# Patient Record
Sex: MALE | URBAN - METROPOLITAN AREA
[De-identification: names, ages, dates, MRNs, and addresses within clinical notes are randomized per-mention and may not be internally consistent; named-entity substitution may affect disease eponyms.]

---

## 2017-06-30 ENCOUNTER — APPOINTMENT (RX ONLY)
Dept: URBAN - METROPOLITAN AREA CLINIC 15 | Facility: CLINIC | Age: 32
Setting detail: DERMATOLOGY
End: 2017-06-30

## 2017-06-30 DIAGNOSIS — B35.6 TINEA CRURIS: ICD-10-CM

## 2017-06-30 DIAGNOSIS — L73.9 FOLLICULAR DISORDER, UNSPECIFIED: ICD-10-CM

## 2017-06-30 DIAGNOSIS — L663 OTHER SPECIFIED DISEASES OF HAIR AND HAIR FOLLICLES: ICD-10-CM

## 2017-06-30 DIAGNOSIS — L85.3 XEROSIS CUTIS: ICD-10-CM

## 2017-06-30 DIAGNOSIS — L738 OTHER SPECIFIED DISEASES OF HAIR AND HAIR FOLLICLES: ICD-10-CM

## 2017-06-30 PROBLEM — L70.0 ACNE VULGARIS: Status: ACTIVE | Noted: 2017-06-30

## 2017-06-30 PROBLEM — L02.425 FURUNCLE OF RIGHT LOWER LIMB: Status: ACTIVE | Noted: 2017-06-30

## 2017-06-30 PROBLEM — L02.426 FURUNCLE OF LEFT LOWER LIMB: Status: ACTIVE | Noted: 2017-06-30

## 2017-06-30 PROCEDURE — ? COUNSELING

## 2017-06-30 PROCEDURE — ? PRESCRIPTION

## 2017-06-30 PROCEDURE — 99214 OFFICE O/P EST MOD 30 MIN: CPT

## 2017-06-30 PROCEDURE — ? PATIENT SPECIFIC COUNSELING

## 2017-06-30 RX ORDER — CLOTRIMAZOLE 10 MG/G
CREAM TOPICAL
Qty: 2 | Refills: 2 | Status: ERX | COMMUNITY
Start: 2017-06-30

## 2017-06-30 RX ADMIN — CLOTRIMAZOLE: 10 CREAM TOPICAL at 00:00

## 2017-06-30 ASSESSMENT — LOCATION DETAILED DESCRIPTION DERM
LOCATION DETAILED: LEFT ANTERIOR PROXIMAL THIGH
LOCATION DETAILED: RIGHT ANTERIOR PROXIMAL THIGH
LOCATION DETAILED: SUPRAPUBIC SKIN

## 2017-06-30 ASSESSMENT — LOCATION SIMPLE DESCRIPTION DERM
LOCATION SIMPLE: LEFT THIGH
LOCATION SIMPLE: RIGHT THIGH
LOCATION SIMPLE: GROIN

## 2017-06-30 ASSESSMENT — LOCATION ZONE DERM
LOCATION ZONE: LEG
LOCATION ZONE: TRUNK

## 2017-06-30 NOTE — PROCEDURE: PATIENT SPECIFIC COUNSELING
We discussed with patient to use a new razor double blade every time he shaves. \\nWarm Luke water\\nVain shaving cream\\nZeosorb AF powder to apply before working out. \\nCotton underwear, avoid tight clothe.
Detail Level: Zone
Lotrimin cream apply 1 thin layer mixed with Desitin creamy baby 2 thin layer twice a day x 3 weeks. \\nDry areas with cold blow dryer air. \\nZeosorb AF powder apply before going to the gym.

## 2017-07-12 ENCOUNTER — APPOINTMENT (RX ONLY)
Dept: URBAN - METROPOLITAN AREA CLINIC 15 | Facility: CLINIC | Age: 32
Setting detail: DERMATOLOGY
End: 2017-07-12

## 2017-07-12 ENCOUNTER — RX ONLY (OUTPATIENT)
Age: 32
Setting detail: RX ONLY
End: 2017-07-12

## 2017-07-12 DIAGNOSIS — L30.4 ERYTHEMA INTERTRIGO: ICD-10-CM

## 2017-07-12 DIAGNOSIS — L663 OTHER SPECIFIED DISEASES OF HAIR AND HAIR FOLLICLES: ICD-10-CM

## 2017-07-12 DIAGNOSIS — L85.3 XEROSIS CUTIS: ICD-10-CM

## 2017-07-12 DIAGNOSIS — L73.9 FOLLICULAR DISORDER, UNSPECIFIED: ICD-10-CM

## 2017-07-12 DIAGNOSIS — L738 OTHER SPECIFIED DISEASES OF HAIR AND HAIR FOLLICLES: ICD-10-CM

## 2017-07-12 PROBLEM — L02.426 FURUNCLE OF LEFT LOWER LIMB: Status: ACTIVE | Noted: 2017-07-12

## 2017-07-12 PROBLEM — L02.224 FURUNCLE OF GROIN: Status: ACTIVE | Noted: 2017-07-12

## 2017-07-12 PROCEDURE — ? COUNSELING

## 2017-07-12 PROCEDURE — ? PATIENT SPECIFIC COUNSELING

## 2017-07-12 PROCEDURE — ? PRESCRIPTION

## 2017-07-12 PROCEDURE — 99213 OFFICE O/P EST LOW 20 MIN: CPT

## 2017-07-12 RX ORDER — DOXYCYCLINE HYCLATE 100 MG/1
CAPSULE, GELATIN COATED ORAL
Qty: 15 | Refills: 0 | Status: ERX | COMMUNITY
Start: 2017-07-12

## 2017-07-12 RX ORDER — KETOCONAZOLE 20 MG/G
CREAM TOPICAL
Qty: 1 | Refills: 2 | Status: ERX | COMMUNITY
Start: 2017-07-12

## 2017-07-12 RX ADMIN — KETOCONAZOLE: 20 CREAM TOPICAL at 00:00

## 2017-07-12 ASSESSMENT — LOCATION ZONE DERM
LOCATION ZONE: TRUNK
LOCATION ZONE: ARM
LOCATION ZONE: LEG
LOCATION ZONE: LEG

## 2017-07-12 ASSESSMENT — LOCATION DETAILED DESCRIPTION DERM
LOCATION DETAILED: LEFT PROXIMAL POSTERIOR UPPER ARM
LOCATION DETAILED: RIGHT PROXIMAL POSTERIOR UPPER ARM
LOCATION DETAILED: LEFT ANTERIOR PROXIMAL THIGH
LOCATION DETAILED: LEFT INGUINAL CREASE
LOCATION DETAILED: SUPRAPUBIC SKIN
LOCATION DETAILED: LEFT ANTERIOR PROXIMAL THIGH

## 2017-07-12 ASSESSMENT — LOCATION SIMPLE DESCRIPTION DERM
LOCATION SIMPLE: GROIN
LOCATION SIMPLE: LEFT THIGH
LOCATION SIMPLE: LEFT UPPER ARM
LOCATION SIMPLE: RIGHT UPPER ARM
LOCATION SIMPLE: LEFT THIGH

## 2017-11-27 ENCOUNTER — APPOINTMENT (RX ONLY)
Dept: URBAN - METROPOLITAN AREA CLINIC 15 | Facility: CLINIC | Age: 32
Setting detail: DERMATOLOGY
End: 2017-11-27

## 2017-11-27 DIAGNOSIS — Z41.9 ENCOUNTER FOR PROCEDURE FOR PURPOSES OTHER THAN REMEDYING HEALTH STATE, UNSPECIFIED: ICD-10-CM

## 2017-11-27 PROCEDURE — ? FILLERS

## 2017-11-27 PROCEDURE — ? BOTOX

## 2017-11-27 ASSESSMENT — LOCATION SIMPLE DESCRIPTION DERM
LOCATION SIMPLE: RIGHT CHEEK
LOCATION SIMPLE: LEFT CHEEK

## 2017-11-27 ASSESSMENT — LOCATION DETAILED DESCRIPTION DERM
LOCATION DETAILED: LEFT SUPERIOR MEDIAL BUCCAL CHEEK
LOCATION DETAILED: RIGHT INFERIOR MEDIAL MALAR CHEEK
LOCATION DETAILED: LEFT INFERIOR MEDIAL MALAR CHEEK
LOCATION DETAILED: RIGHT SUPERIOR MEDIAL BUCCAL CHEEK

## 2017-11-27 ASSESSMENT — LOCATION ZONE DERM: LOCATION ZONE: FACE

## 2017-11-27 NOTE — PROCEDURE: FILLERS
Post-Care Instructions: Patient instructed to apply ice to reduce swelling.
Additional Area 5 Volume In Cc: 0
Anesthesia Volume In Cc: 0.5
Filler: Juvederm Ultra
Expiration Date (Month Year): 2018/12/05
Expiration Date (Month Year): 2018/11/30
Use Map Statement For Sites (Optional): No
Lot #: [id number]
Detail Level: Simple
Additional Area 1 Location: corners of the mouth and lips
Lot #: [id number]
Additional Area 1 Location: corners of mouth
Lot #: [id number]
Lot #: [id number]
Additional Anesthesia Volume In Cc: 6
Consent: Written consent obtained. Risks include but not limited to bruising, beading, irregular texture, ulceration, infection, allergic reaction, scar formation, incomplete augmentation, temporary nature, procedural pain.
Expiration Date (Month Year): 2019/12/27
Expiration Date (Month Year): 2019/01/25
Topical Anesthesia?: 20% benzocaine, 8% lidocaine, 4% tetracaine
Map Statment: See Attached Map for Complete Details

## 2017-11-27 NOTE — PROCEDURE: BOTOX
Levator Labii Superioris Units: 0
Detail Level: Simple
Expiration Date (Month Year): 5/2020
Lot #: [id number]
Glabellar Complex Units: [address] [facility]
Periorbital Skin Units: 25
Consent: Written consent obtained. Risks include but not limited to lid/brow ptosis, bruising, swelling, diplopia, temporary effect, incomplete chemical denervation.
Additional Area 1 Location: [facility] lines
Dilution (U/0.1 Cc): 2.5
Post-Care Instructions: Patient instructed to not lie down for 4 hours and limit physical activity for 24 hours. Patient instructed not to travel by airplane for 48 hours.
Forehead Units: 22.5

## 2017-12-14 ENCOUNTER — RX ONLY (OUTPATIENT)
Age: 32
Setting detail: RX ONLY
End: 2017-12-14

## 2017-12-14 ENCOUNTER — APPOINTMENT (RX ONLY)
Dept: URBAN - METROPOLITAN AREA CLINIC 15 | Facility: CLINIC | Age: 32
Setting detail: DERMATOLOGY
End: 2017-12-14

## 2017-12-14 DIAGNOSIS — Z41.9 ENCOUNTER FOR PROCEDURE FOR PURPOSES OTHER THAN REMEDYING HEALTH STATE, UNSPECIFIED: ICD-10-CM

## 2017-12-14 PROCEDURE — ? BOTOX

## 2017-12-14 PROCEDURE — ? ADDITIONAL NOTES

## 2017-12-14 RX ORDER — KETOCONAZOLE 20 MG/G
CREAM TOPICAL
Qty: 1 | Refills: 2 | Status: ERX

## 2017-12-14 NOTE — PROCEDURE: BOTOX
Post-Care Instructions: Patient instructed to not lie down for 4 hours and limit physical activity for 24 hours. Patient instructed not to travel by airplane for 48 hours.
Expiration Date (Month Year): 5/2020
[place]eter Units: 0
Lot #: [id number]
Dilution (U/0.1 Cc): 2.5
Forehead Units: 7
Detail Level: Simple
Additional Area 1 Location: [facility] lines
Consent: Written consent obtained. Risks include but not limited to lid/brow ptosis, bruising, swelling, diplopia, temporary effect, incomplete chemical denervation.

## 2018-12-20 ENCOUNTER — APPOINTMENT (RX ONLY)
Dept: URBAN - METROPOLITAN AREA CLINIC 15 | Facility: CLINIC | Age: 33
Setting detail: DERMATOLOGY
End: 2018-12-20

## 2018-12-20 DIAGNOSIS — Z41.9 ENCOUNTER FOR PROCEDURE FOR PURPOSES OTHER THAN REMEDYING HEALTH STATE, UNSPECIFIED: ICD-10-CM

## 2018-12-20 PROCEDURE — ? BOTOX

## 2018-12-20 ASSESSMENT — LOCATION DETAILED DESCRIPTION DERM
LOCATION DETAILED: RIGHT SUPERIOR LATERAL FOREHEAD
LOCATION DETAILED: LEFT CENTRAL MALAR CHEEK
LOCATION DETAILED: RIGHT FOREHEAD
LOCATION DETAILED: LEFT SUPERIOR LATERAL MALAR CHEEK
LOCATION DETAILED: LEFT MEDIAL FOREHEAD
LOCATION DETAILED: RIGHT LATERAL FOREHEAD
LOCATION DETAILED: LEFT SUPERIOR FOREHEAD
LOCATION DETAILED: RIGHT INFERIOR FOREHEAD
LOCATION DETAILED: RIGHT INFERIOR TEMPLE
LOCATION DETAILED: LEFT INFERIOR TEMPLE
LOCATION DETAILED: RIGHT SUPERIOR LATERAL MALAR CHEEK
LOCATION DETAILED: GLABELLA
LOCATION DETAILED: RIGHT SUPERIOR MEDIAL FOREHEAD
LOCATION DETAILED: LEFT LATERAL FOREHEAD
LOCATION DETAILED: LEFT INFERIOR FOREHEAD
LOCATION DETAILED: LEFT SUPERIOR MEDIAL FOREHEAD
LOCATION DETAILED: RIGHT SUPERIOR FOREHEAD
LOCATION DETAILED: RIGHT CENTRAL MALAR CHEEK
LOCATION DETAILED: LEFT SUPERIOR LATERAL FOREHEAD

## 2018-12-20 ASSESSMENT — LOCATION SIMPLE DESCRIPTION DERM
LOCATION SIMPLE: LEFT CHEEK
LOCATION SIMPLE: LEFT TEMPLE
LOCATION SIMPLE: RIGHT TEMPLE
LOCATION SIMPLE: LEFT FOREHEAD
LOCATION SIMPLE: RIGHT CHEEK
LOCATION SIMPLE: RIGHT FOREHEAD
LOCATION SIMPLE: GLABELLA

## 2018-12-20 ASSESSMENT — LOCATION ZONE DERM: LOCATION ZONE: FACE

## 2018-12-20 NOTE — PROCEDURE: BOTOX
Forehead Units: [address] [facility]
Additional Area 1 Units: 0
Detail Level: Zone
Additional Area 1 Location: [facility]
Topical Anesthesia?: 20% benzocaine, 8% lidocaine, 4% tetracaine
Consent: Written consent obtained. Risks include but not limited to lid/brow ptosis, bruising, swelling, diplopia, temporary effect, incomplete chemical denervation.
Dilution (U/0.1 Cc): 4
Post-Care Instructions: Patient instructed to not lie down for 4 hours and limit physical activity for 24 hours. Patient instructed not to travel by airplane for 48 hours.
Glabellar Complex Units: 20
Expiration Date (Month Year): 105/2021
Lot #: [id number]
Additional Area 4 Location: nasal tip lifting injection :2.5 Units.
Length Of Topical Anesthesia Application (Optional): 45 minutes

## 2019-01-29 ENCOUNTER — APPOINTMENT (RX ONLY)
Dept: URBAN - METROPOLITAN AREA CLINIC 15 | Facility: CLINIC | Age: 34
Setting detail: DERMATOLOGY
End: 2019-01-29

## 2019-01-29 DIAGNOSIS — Z41.9 ENCOUNTER FOR PROCEDURE FOR PURPOSES OTHER THAN REMEDYING HEALTH STATE, UNSPECIFIED: ICD-10-CM

## 2019-01-29 PROCEDURE — ? ADDITIONAL NOTES

## 2019-01-29 PROCEDURE — ? JUVEDERM ULTRA INJECTION

## 2019-01-29 ASSESSMENT — LOCATION SIMPLE DESCRIPTION DERM
LOCATION SIMPLE: RIGHT CHEEK
LOCATION SIMPLE: LEFT CHEEK

## 2019-01-29 ASSESSMENT — LOCATION ZONE DERM: LOCATION ZONE: FACE

## 2019-01-29 ASSESSMENT — LOCATION DETAILED DESCRIPTION DERM
LOCATION DETAILED: RIGHT INFERIOR CENTRAL MALAR CHEEK
LOCATION DETAILED: LEFT INFERIOR CENTRAL MALAR CHEEK
LOCATION DETAILED: LEFT LATERAL MALAR CHEEK
LOCATION DETAILED: LEFT CENTRAL MALAR CHEEK
LOCATION DETAILED: RIGHT INFERIOR LATERAL MALAR CHEEK
LOCATION DETAILED: RIGHT CENTRAL MALAR CHEEK

## 2019-01-29 NOTE — PROCEDURE: ADDITIONAL NOTES
Additional Notes: Patient has a personal history with acne scars on the cheeks that in the past scar subcision was done along with Juvederm Ultra. He is here today for the scar subcision treatment along with Juvederm Ultra, however today will charge just for the syringe. \\n\\nJuvederm Ultra $650
Detail Level: Simple

## 2019-01-29 NOTE — HPI: COSMETIC (FILLERS)
Have You Had Fillers Before?: has had fillers
Additional History: Price quote given as $650 for Pascual Brothstaci

## 2019-01-29 NOTE — PROCEDURE: JUVEDERM ULTRA INJECTION
Detail Level: Simple
Use Map Statement For Sites (Optional): Yes
Lateral Face Filler  Volume In Cc: 0
Filler: Juvederm Ultra
Map Statment: See [address] for Complete Details
Topical Anesthesia?: BLT cream (benzocaine 20%, lidocaine 6%, tetracaine 4%)
Cheeks Filler Volume In Cc: 1
Lot #: [id number]
Include Cannula Information In Note?: No
Consent: Written consent obtained. Risks include but not limited to bruising, beading, irregular texture, ulceration, infection, allergic reaction, scar formation, incomplete augmentation, temporary nature, procedural pain.
Expiration Date (Month Year): 2019-12-12
Procedural Text: The filler was administered to the treatment areas noted above.
Post-Care Instructions: Patient instructed to apply ice to reduce swelling.

## 2022-08-23 ENCOUNTER — APPOINTMENT (RX ONLY)
Dept: URBAN - METROPOLITAN AREA CLINIC 15 | Facility: CLINIC | Age: 37
Setting detail: DERMATOLOGY
End: 2022-08-23

## 2022-08-23 DIAGNOSIS — Z41.9 ENCOUNTER FOR PROCEDURE FOR PURPOSES OTHER THAN REMEDYING HEALTH STATE, UNSPECIFIED: ICD-10-CM

## 2022-08-23 PROCEDURE — ? FILLERS

## 2022-08-23 PROCEDURE — ? BOTOX

## 2022-08-23 NOTE — PROCEDURE: FILLERS
Include Cannula Size?: 22G
Number Of Syringes (Required For Inventory): 1
Brows Filler  Volume In Cc: 0
Anesthesia Type: 1% lidocaine with epinephrine
Include Cannula Information In Note?: No
Consent: Written consent obtained. Risks include but not limited to bruising, beading, irregular texture, ulceration, infection, allergic reaction, scar formation, incomplete augmentation, temporary nature, procedural pain.
Additional Area 3 Location: [facility]OL[facility], [place], oral commesure
Include Cannula Length?: 1.5 inch
Map Statment: See [address] for Complete Details
Post-Care Instructions: Patient instructed to apply ice to reduce swelling.
Anesthesia Volume In Cc: 0.5
Additional Anesthesia Volume In Cc: 6
Include Cannula Information In Note?: Yes
Inventory Information: This plan will send filler information to inventory based on the fillers you select. Multiple fillers can be sent but you must ensure you select the appropriate fillers in the inventory tab.
Detail Level: Detailed
Include Cannula Brand?: DermaSculpt
Additional Area 1 Location: [facility]
Filler: Juvederm Ultra Plus XC
Show Inventory Tab: Show
Include Cannula Size?: 25G
Additional Area 2 Location: [facility], perioral
Additional Area 3 Location: cheeks acne scars
Number Of Syringes (Required For Inventory): 2
Additional Area 1 Location: medial cheeks, chin shadow
Additional Area 4 Location: buttocks

## 2022-08-23 NOTE — PROCEDURE: BOTOX
Forehead Units: 0
Show Additional Area 3: Yes
Show Right And Left Periorbital Units: No
Additional Area 6 Location: anterior neck
Consent: Written consent obtained. Risks include but not limited to lid/brow ptosis, bruising, swelling, diplopia, temporary effect, incomplete chemical denervation.
Show Inventory Tab: Show
Additional Area 3 Location: [facility]
Post-Care Instructions: Patient instructed to not lie down for 4 hours and limit physical activity for 24 hours. Patient instructed not to travel by airplane for 48 hours.
Lot #: [id number]
Expiration Date (Month Year): 12/24
Additional Area 4 Location: lip flip
Additional Area 1 Location: right forehead
Dilution (U/0.1 Cc): 0.5
Additional Area 5 Location: chin
Detail Level: Zone
Additional Area 2 Location: left lateral brows